# Patient Record
Sex: MALE | Race: WHITE
[De-identification: names, ages, dates, MRNs, and addresses within clinical notes are randomized per-mention and may not be internally consistent; named-entity substitution may affect disease eponyms.]

---

## 2018-06-21 ENCOUNTER — HOSPITAL ENCOUNTER (EMERGENCY)
Dept: HOSPITAL 80 - CED | Age: 28
Discharge: HOME | End: 2018-06-21
Payer: SELF-PAY

## 2018-06-21 VITALS — SYSTOLIC BLOOD PRESSURE: 145 MMHG | DIASTOLIC BLOOD PRESSURE: 94 MMHG

## 2018-06-21 DIAGNOSIS — J06.9: Primary | ICD-10-CM

## 2018-06-21 NOTE — EDPHY
H & P


Time Seen by Provider: 06/21/18 20:44


HPI/ROS: 





CHIEF COMPLAINT:  Cough





HISTORY OF PRESENT ILLNESS: Mr. Holliday a 27-year-old male who presents with 2 

days of cough, bodyaches, and congestion.  Cough is not productive.  He feels 

that he has been warm, but does not have a thermometer to check his 

temperature.  He has a mild headache.  No earache or sore throat.  He denies 

nausea, vomiting, diarrhea, constipation, or abdominal pain.  He is eating and 

drinking normally.  He did not have a flu shot this year.  His roommate 

recently had a cough.





REVIEW OF SYSTEMS:


A ten point review of systems was performed and is negative with the exception 

of the items mentioned in the HPI.





Past medical history:  Negative





Past surgical history:  Negative





Family history:  Unknown, he is adopted.





Social history:  He lives in [place].  He works as a .  No 

tobacco products.  He is here visiting his father, who accompanies him tonight.





General Appearance:  Alert.  Vital signs reviewed.  Blood pressure 145/94 at 

triage.


Eyes:  Pupils equal and round, no conjunctival injection, no discharge. 

Anicteric.


ENT, Mouth:  Mucous membranes are moist, no oropharyngeal erythema or edema.  

Tympanic membranes normal.


Neck:  Mild anterior cervical lymphadenopathy, supple.  No meningeal signs.


Respiratory:  Lungs are clear to auscultation; no wheezes, rales, or rhonchi.


Cardiovascular:  Regular rate and rhythm; no murmur, rub, or gallop.


Gastrointestinal:  Abdomen is soft and nontender, no masses or organomegaly, 

bowel sounds normal.


Skin:  Warm and dry, no rashes on exposed skin, normal color.


Back:  Nontender to palpation over the thoracolumbar spine. No CVAT.


Extremities:  No lower extremity edema, no calf tenderness or swelling.


Neurological:  Alert and oriented.  Moving all four extremities easily and 

equally.  MELANIE.  EOMI.  Tongue midline.  Facial expressions symmetric.


Psychiatric:  Normal affect.


Constitutional: 


 Initial Vital Signs











Temperature (C)  37.7 C   06/21/18 21:00


 


Heart Rate  90   06/21/18 21:00


 


Respiratory Rate  14   06/21/18 21:00


 


Blood Pressure  145/94 H  06/21/18 21:00


 


O2 Sat (%)  97   06/21/18 21:00








 











O2 Delivery Mode               Room Air














Allergies/Adverse Reactions: 


 





Penicillins Allergy (Verified 06/21/18 20:48)


 








Home Medications: 














 Medication  Instructions  Recorded


 


HYDROcodone/HOMATROPINE HYCODA 1 tsp PO Q4-6PRN PRN #120 ml 06/21/18





[Hycodan Syrup (*)]  


 


RITALIN LA  06/21/18














Medical Decision Making


ED Course/Re-evaluation: 





Essentially normal physical exam aside from frequent dry cough.  This is likely 

a viral respiratory infection.  I do not suspect pneumonia.  There is nothing 

to suggest sepsis.  No wheezing or evidence of reactive airway disease.  He is 

not a smoker and I do not think that this is bronchitis or COPD.  I am 

recommending symptomatic treatment.  Pharmacies are closed--he is given a 

prescription for Hycodan syrup and a prepack of Vicodin to use tonight for 

cough suppression.  He is given information on treatment of aches and pains.  

Danger signs reviewed.





Departure





- Departure


Disposition: Home, Routine, Self-Care


Clinical Impression: 


 Upper respiratory infection, viral





Condition: Good


Instructions:  Upper Respiratory Infection (ED)


Additional Instructions: 


As we discussed, I think that you have a viral respiratory infection.  

Antibiotics will not help in this situation.





I recommend that you take Tylenol and ibuprofen for fever and aches and pains.  

The doses are below.


Adult Pain & Fever Control:


We recommend Acetaminophen (Tylenol) and Ibuprofen (Motrin,Advil) for pain and 

fever control.  When fever is high or pain severe, both drugs can be used at 

the same time, but at different intervals.  Please note the time differences.  


Your dose is:     Acetaminophen [650]mg every 4 to 6 hours


        Ibuprofen [400]mg every [8] hours with food   OR


         


Note:  do not take Acetaminophen with Hydrocodone (Vicodin, Lortab) or Oycodone 

(Percocet).  These medications also contain Acetaminophen.  No more than 3000mg 

of Acetaminophen should be taken in 24 hours (for an adult).





I am providing if you Vicodin pills to use tonight for cough suppression.  

These contain hydrocodone, an opiate medication.  You can take 1-2 at a time 

every 4 hr for cough and pain.  I am providing a prescription for Hycodan which 

is a cough syrup.  You can fill this prescription tomorrow.





I also recommend [place] [employer].  Mix in some honey to improve the 

flavor.  You can buy these in most grocery stores.





If you have new or concerning symptoms--persistent high fever, difficulty 

breathing, any concerning problems--please be re-evaluated.  I am providing the 

name of a local primary care physician, should you need one. 


Referrals: 


Kevin Cole MD [Medical Doctor] - As per Instructions


Prescriptions: 


HYDROcodone/HOMATROPINE HYCODA [Hycodan Syrup (*)] 1 tsp PO Q4-6PRN PRN #120 ml


 PRN Reason: Cough, Severe